# Patient Record
(demographics unavailable — no encounter records)

---

## 2024-11-07 NOTE — HISTORY OF PRESENT ILLNESS
[de-identified] : Nigel is a 3-year-old female initially consulted by Dr Meehan 3/26/24 for constipation. She is here today for follow up.  After initial visit trialed Miralax, 1 capful daily mixed in Milk; giving consistently but mom felt it didn't help her. Started a probiotic. Decreased amount of milk to 8 ounces daily (no change in stool pattern).  After talking to Dr Meehan in September. started ex lax 1/2 -1 square, which was working until recently.  Continues to skip multiple days without a BM. BM's once/week. Does exhibit stool withholding behavior. Will squat and attempt to push.  Stool consistency varies from bristol 1 to bristol 7. Has seen blood and mucus.  Fecal soiling and urinary accidents occurring often over the past few weeks.   Using liquid suppositories if no BM in 1 week. Will stool with suppository is usually loose and large volume. Toilet trained but recently with soiling in pull up during the day. Abdominal distention noted when no BM.  No vomiting. Eating well. No change in appetite. No other daily medications.  Gaining weight and growing well.   Initial consult: Born full term without complication.  She had uncomplicated infant chronic constipation and milk protein allergy resolved on Alimentum.

## 2024-11-07 NOTE — HISTORY OF PRESENT ILLNESS
[de-identified] : Nigel is a 3-year-old female initially consulted by Dr Meehan 3/26/24 for constipation. She is here today for follow up.  After initial visit trialed Miralax, 1 capful daily mixed in Milk; giving consistently but mom felt it didn't help her. Started a probiotic. Decreased amount of milk to 8 ounces daily (no change in stool pattern).  After talking to Dr Meehan in September. started ex lax 1/2 -1 square, which was working until recently.  Continues to skip multiple days without a BM. BM's once/week. Does exhibit stool withholding behavior. Will squat and attempt to push.  Stool consistency varies from bristol 1 to bristol 7. Has seen blood and mucus.  Fecal soiling and urinary accidents occurring often over the past few weeks.   Using liquid suppositories if no BM in 1 week. Will stool with suppository is usually loose and large volume. Toilet trained but recently with soiling in pull up during the day. Abdominal distention noted when no BM.  No vomiting. Eating well. No change in appetite. No other daily medications.  Gaining weight and growing well.   Initial consult: Born full term without complication.  She had uncomplicated infant chronic constipation and milk protein allergy resolved on Alimentum.

## 2024-11-07 NOTE — CONSULT LETTER
[Dear  ___] : Dear  [unfilled], [Consult Letter:] : I had the pleasure of evaluating your patient, [unfilled]. [Please see my note below.] : Please see my note below. [Consult Closing:] : Thank you very much for allowing me to participate in the care of this patient.  If you have any questions, please do not hesitate to contact me. [Sincerely,] : Sincerely, [FreeTextEntry3] : Shivani Law, RN, MSN, CPNP  Certified Pediatric Nurse Practitioner  Earl Meehan MD MS [facility], [place]

## 2024-11-07 NOTE — CONSULT LETTER
[Dear  ___] : Dear  [unfilled], [Consult Letter:] : I had the pleasure of evaluating your patient, [unfilled]. [Please see my note below.] : Please see my note below. [Consult Closing:] : Thank you very much for allowing me to participate in the care of this patient.  If you have any questions, please do not hesitate to contact me. [Sincerely,] : Sincerely, [FreeTextEntry3] : Shivani Law, RN, MSN, CPNP  Certified Pediatric Nurse Practitioner  Earl Meehan MD MS [facility]

## 2024-11-07 NOTE — ASSESSMENT
[Educated Patient & Family about Diagnosis] : educated the patient and family about the diagnosis [FreeTextEntry1] : Nigel is a 3-year-old female with chronic constipation likely functional in nature. Trialed Miralax with minimal relief of symptoms. Switched to Ex lax 1/2 to 1 square daily, now with infrequent bowel movements and fecal soiling likely due to insufficient ex lax dosing.  Plan: -Give 1 fleet saline enema (133mL) today -Then increase to 1.5 square of ex lax tomorrow. Titrate by 1/2 square. Titration instructions reviewed to achieve very soft stool on a daily basis. -Discussed the goals of care related to establishing a regular bowel movement pattern. -FUV with NP in 6-8 weeks. Dr Meehan to remain available for new or worsening symptoms. -Call sooner with questions, concerns or change in clinical status.

## 2024-11-07 NOTE — HISTORY OF PRESENT ILLNESS
[de-identified] : Nigel is a 3-year-old female initially consulted by Dr Meehan 3/26/24 for constipation. She is here today for follow up.  After initial visit trialed Miralax, 1 capful daily mixed in Milk; giving consistently but mom felt it didn't help her. Started a probiotic. Decreased amount of milk to 8 ounces daily (no change in stool pattern).  After talking to Dr Meehan in September. started ex lax 1/2 -1 square, which was working until recently.  Continues to skip multiple days without a BM. BM's once/week. Does exhibit stool withholding behavior. Will squat and attempt to push.  Stool consistency varies from bristol 1 to bristol 7. Has seen blood and mucus.  Fecal soiling and urinary accidents occurring often over the past few weeks.   Using liquid suppositories if no BM in 1 week. Will stool with suppository is usually loose and large volume. Toilet trained but recently with soiling in pull up during the day. Abdominal distention noted when no BM.  No vomiting. Eating well. No change in appetite. No other daily medications.  Gaining weight and growing well.   Initial consult: Born full term without complication.  She had uncomplicated infant chronic constipation and milk protein allergy resolved on Alimentum.

## 2024-11-07 NOTE — CONSULT LETTER
[Dear  ___] : Dear  [unfilled], [Consult Letter:] : I had the pleasure of evaluating your patient, [unfilled]. [Please see my note below.] : Please see my note below. [Consult Closing:] : Thank you very much for allowing me to participate in the care of this patient.  If you have any questions, please do not hesitate to contact me. [Sincerely,] : Sincerely, [FreeTextEntry3] : Shivani Law, RN, MSN, CPNP  Certified Pediatric Nurse Practitioner  Earl Meehan MD MS [facility]

## 2024-11-07 NOTE — PHYSICAL EXAM
[Well Nourished] : well nourished [NAD] : in no acute distress [Moist & Pink Mucous Membranes] : moist and pink mucous membranes [CTAB] : lungs clear to auscultation bilaterally [Regular Rate and Rhythm] : regular rate and rhythm [Normal S1, S2] : normal S1 and S2 [Normal Bowel Sounds] : normal bowel sounds [No HSM] : no hepatosplenomegaly appreciated [Normal Position] : normal position [Normal Tone] : normal tone [Well-Perfused] : well-perfused [Interactive] : interactive [Well Developed] : well developed [Alert and Active] : alert and active [Soft] : soft  [Stool Palpable] : stool palpable [Appropriate Behavior] : appropriate behavior [icteric] : anicteric [Respiratory Distress] : no respiratory distress  [Distended] : non distended [Tender] : non tender [Tags] : no skin tags  [Fissure] : no anal fissures  [Edema] : no edema [Cyanosis] : no cyanosis [Rash] : no rash [Jaundice] : no jaundice [de-identified] : stool palpable throughout [de-identified] : liquid stool in pull up

## 2024-11-07 NOTE — END OF VISIT
[FreeTextEntry3] : I, Dr. Meehan, personally performed the evaluation and management (E/M) services for this established patient who presents today with (a) new problem(s)/exacerbation of (an) existing condition(s). That E/M includes conducting the clinically appropriate interval history &/or exam, assessing all new/exacerbated conditions, and establishing a new plan of care. Today, my AMINATA, Shivani Law, was here to observe my evaluation and management service for this new problem/exacerbated condition and follow the plan of care established by me going forward.

## 2024-11-07 NOTE — PHYSICAL EXAM
[Well Nourished] : well nourished [NAD] : in no acute distress [Moist & Pink Mucous Membranes] : moist and pink mucous membranes [CTAB] : lungs clear to auscultation bilaterally [Regular Rate and Rhythm] : regular rate and rhythm [Normal S1, S2] : normal S1 and S2 [Normal Bowel Sounds] : normal bowel sounds [No HSM] : no hepatosplenomegaly appreciated [Normal Position] : normal position [Normal Tone] : normal tone [Well-Perfused] : well-perfused [Interactive] : interactive [Well Developed] : well developed [Alert and Active] : alert and active [Soft] : soft  [Stool Palpable] : stool palpable [Appropriate Behavior] : appropriate behavior [icteric] : anicteric [Respiratory Distress] : no respiratory distress  [Distended] : non distended [Tender] : non tender [Tags] : no skin tags  [Fissure] : no anal fissures  [Edema] : no edema [Cyanosis] : no cyanosis [Rash] : no rash [Jaundice] : no jaundice [de-identified] : stool palpable throughout [de-identified] : liquid stool in pull up

## 2024-11-07 NOTE — PHYSICAL EXAM
[Well Nourished] : well nourished [NAD] : in no acute distress [Moist & Pink Mucous Membranes] : moist and pink mucous membranes [CTAB] : lungs clear to auscultation bilaterally [Regular Rate and Rhythm] : regular rate and rhythm [Normal S1, S2] : normal S1 and S2 [Normal Bowel Sounds] : normal bowel sounds [No HSM] : no hepatosplenomegaly appreciated [Normal Position] : normal position [Normal Tone] : normal tone [Well-Perfused] : well-perfused [Interactive] : interactive [Well Developed] : well developed [Alert and Active] : alert and active [Soft] : soft  [Stool Palpable] : stool palpable [Appropriate Behavior] : appropriate behavior [icteric] : anicteric [Respiratory Distress] : no respiratory distress  [Distended] : non distended [Tender] : non tender [Tags] : no skin tags  [Fissure] : no anal fissures  [Edema] : no edema [Cyanosis] : no cyanosis [Rash] : no rash [Jaundice] : no jaundice [de-identified] : stool palpable throughout [de-identified] : liquid stool in pull up